# Patient Record
Sex: MALE | Race: OTHER | NOT HISPANIC OR LATINO | URBAN - METROPOLITAN AREA
[De-identification: names, ages, dates, MRNs, and addresses within clinical notes are randomized per-mention and may not be internally consistent; named-entity substitution may affect disease eponyms.]

---

## 2018-02-09 ENCOUNTER — EMERGENCY (EMERGENCY)
Facility: HOSPITAL | Age: 38
LOS: 1 days | Discharge: HOME | End: 2018-02-09
Attending: EMERGENCY MEDICINE | Admitting: EMERGENCY MEDICINE
Payer: SELF-PAY

## 2018-02-09 VITALS
TEMPERATURE: 98 F | DIASTOLIC BLOOD PRESSURE: 96 MMHG | HEART RATE: 89 BPM | RESPIRATION RATE: 18 BRPM | SYSTOLIC BLOOD PRESSURE: 142 MMHG | OXYGEN SATURATION: 99 %

## 2018-02-09 DIAGNOSIS — M24.812 OTHER SPECIFIC JOINT DERANGEMENTS OF LEFT SHOULDER, NOT ELSEWHERE CLASSIFIED: ICD-10-CM

## 2018-02-09 DIAGNOSIS — M25.512 PAIN IN LEFT SHOULDER: ICD-10-CM

## 2018-02-09 PROCEDURE — 73030 X-RAY EXAM OF SHOULDER: CPT

## 2018-02-09 PROCEDURE — 73030 X-RAY EXAM OF SHOULDER: CPT | Mod: 26

## 2018-02-09 PROCEDURE — 99284 EMERGENCY DEPT VISIT MOD MDM: CPT | Mod: 25

## 2018-02-09 PROCEDURE — 99053 MED SERV 10PM-8AM 24 HR FAC: CPT

## 2018-02-09 PROCEDURE — 99283 EMERGENCY DEPT VISIT LOW MDM: CPT | Mod: 25

## 2018-02-09 NOTE — ED PROVIDER NOTE - OBJECTIVE STATEMENT
37 yom pw L shoulder pain.  seen earlier at [facility] for dislocation (snowboard injury), was reduced and placed in sling.  pt went home and took off sling temporarily and felt something popped.  no other injuries reported.  no headache, no neck pain.  ambulating well.  no paresthesia

## 2018-02-09 NOTE — ED ADULT NURSE NOTE - OBJECTIVE STATEMENT
37 yr old male presents to the ed with stating "I think my shoulder is out again". pt states he had a left shoulder dislocation this morning at 10am which was reduced at another hospital. pt states he went to bed at 9pm and removed sling and "noticed it came out again". denies any pain, numbness/tingling to extremity. +3 radial pulse and <2 second cap refill noted. md loya notified. pt moved to north as per md loya and hand off report given to sho zaldivar and md arauz. 37 yr old male presents to the ed stating "I think my shoulder is out again". pt states he had a left shoulder dislocation this morning at 10am which was reduced at another hospital. pt states he went to bed at 9pm and removed sling and "noticed it came out again". denies any pain, numbness/tingling to extremity. +3 radial pulse and <2 second cap refill noted. md loya notified. pt moved to north as per md loya and hand off report given to sho zaldivar and md arauz. 37 yr old male presents to the ed stating "I think my shoulder is out again". pt states he had a left shoulder dislocation this morning while snowboarding at 10am which was reduced at another hospital. pt states he removed the sling at 9pm and "noticed it came out again". denies any pain, numbness/tingling to extremity. +3 radial pulse and <2 second cap refill noted. md loya notified. pt moved to north as per md loya and hand off report given to sho zaldivar and md arauz.

## 2018-02-09 NOTE — ED PROVIDER NOTE - PHYSICAL EXAMINATION
CON: ao x 3, HENMT: clear oropharynx, soft neck, HEAD: atraumatic, SKIN: no rash, MSK: noted deformity to L AC joint, pulses intact

## 2018-02-09 NOTE — ED PROVIDER NOTE - MEDICAL DECISION MAKING DETAILS
shoulder pain after relocation, xray showed ac joint separation, pt in sling, neurovascular intact, will give orthopedic follow up

## 2018-02-09 NOTE — ED PROVIDER NOTE - DIAGNOSTIC INTERPRETATION
ER Physician: Asad ROD SHOULDER XRAY INTERPRETATION:  no acute fracture; no soft tissue swelling noted; normal bony alignment. ER Physician: Asad ROD SHOULDER XRAY INTERPRETATION:  no dislocation; no soft tissue swelling noted; abnormal bony alignment of AC joint